# Patient Record
Sex: MALE | Race: WHITE | NOT HISPANIC OR LATINO | URBAN - METROPOLITAN AREA
[De-identification: names, ages, dates, MRNs, and addresses within clinical notes are randomized per-mention and may not be internally consistent; named-entity substitution may affect disease eponyms.]

---

## 2018-04-25 ENCOUNTER — OFFICE (OUTPATIENT)
Dept: URBAN - METROPOLITAN AREA CLINIC 11 | Facility: CLINIC | Age: 73
End: 2018-04-25

## 2018-04-25 VITALS
HEIGHT: 66 IN | WEIGHT: 182 LBS | HEART RATE: 90 BPM | SYSTOLIC BLOOD PRESSURE: 151 MMHG | DIASTOLIC BLOOD PRESSURE: 83 MMHG

## 2018-04-25 DIAGNOSIS — K59.00 CONSTIPATION, UNSPECIFIED: ICD-10-CM

## 2018-04-25 DIAGNOSIS — E11.9 TYPE 2 DIABETES MELLITUS WITHOUT COMPLICATIONS: ICD-10-CM

## 2018-04-25 DIAGNOSIS — R14.2 ERUCTATION: ICD-10-CM

## 2018-04-25 DIAGNOSIS — R14.0 ABDOMINAL DISTENSION (GASEOUS): ICD-10-CM

## 2018-04-25 DIAGNOSIS — R68.81 EARLY SATIETY: ICD-10-CM

## 2018-04-25 PROCEDURE — 99202 OFFICE O/P NEW SF 15 MIN: CPT

## 2018-09-18 ENCOUNTER — OFFICE (OUTPATIENT)
Dept: URBAN - METROPOLITAN AREA CLINIC 11 | Facility: CLINIC | Age: 73
End: 2018-09-18

## 2018-11-01 ENCOUNTER — OFFICE (OUTPATIENT)
Dept: URBAN - METROPOLITAN AREA CLINIC 11 | Facility: CLINIC | Age: 73
End: 2018-11-01

## 2018-11-01 VITALS
WEIGHT: 177 LBS | DIASTOLIC BLOOD PRESSURE: 77 MMHG | HEIGHT: 66 IN | SYSTOLIC BLOOD PRESSURE: 133 MMHG | HEART RATE: 96 BPM

## 2018-11-01 DIAGNOSIS — R12 HEARTBURN: ICD-10-CM

## 2018-11-01 DIAGNOSIS — R19.4 CHANGE IN BOWEL HABIT: ICD-10-CM

## 2018-11-01 DIAGNOSIS — E66.9 OBESITY, UNSPECIFIED: ICD-10-CM

## 2018-11-01 DIAGNOSIS — E11.9 TYPE 2 DIABETES MELLITUS WITHOUT COMPLICATIONS: ICD-10-CM

## 2018-11-01 PROCEDURE — 99213 OFFICE O/P EST LOW 20 MIN: CPT

## 2018-11-01 RX ORDER — FAMOTIDINE, CALCIUM CARBONATE, AND MAGNESIUM HYDROXIDE 10; 800; 165 MG/1; MG/1; MG/1
30 TABLET, CHEWABLE ORAL
Qty: 30 | Refills: 0 | Status: COMPLETED
Start: 2018-11-01 | End: 2020-12-16

## 2019-07-29 ENCOUNTER — OFFICE (OUTPATIENT)
Dept: URBAN - METROPOLITAN AREA CLINIC 11 | Facility: CLINIC | Age: 74
End: 2019-07-29

## 2019-07-29 VITALS
DIASTOLIC BLOOD PRESSURE: 92 MMHG | HEIGHT: 66 IN | HEART RATE: 90 BPM | DIASTOLIC BLOOD PRESSURE: 76 MMHG | WEIGHT: 173 LBS | SYSTOLIC BLOOD PRESSURE: 144 MMHG | SYSTOLIC BLOOD PRESSURE: 149 MMHG

## 2019-07-29 DIAGNOSIS — R14.2 ERUCTATION: ICD-10-CM

## 2019-07-29 DIAGNOSIS — R10.32 LEFT LOWER QUADRANT PAIN: ICD-10-CM

## 2019-07-29 DIAGNOSIS — F45.8 OTHER SOMATOFORM DISORDERS: ICD-10-CM

## 2019-07-29 PROCEDURE — 99215 OFFICE O/P EST HI 40 MIN: CPT

## 2019-10-22 ENCOUNTER — OFFICE (OUTPATIENT)
Dept: URBAN - METROPOLITAN AREA CLINIC 11 | Facility: CLINIC | Age: 74
End: 2019-10-22

## 2019-10-22 VITALS
WEIGHT: 176 LBS | DIASTOLIC BLOOD PRESSURE: 78 MMHG | HEIGHT: 66 IN | SYSTOLIC BLOOD PRESSURE: 178 MMHG | HEART RATE: 113 BPM

## 2019-10-22 DIAGNOSIS — R10.32 LEFT LOWER QUADRANT PAIN: ICD-10-CM

## 2019-10-22 DIAGNOSIS — R14.2 ERUCTATION: ICD-10-CM

## 2019-10-22 DIAGNOSIS — R14.3 FLATULENCE: ICD-10-CM

## 2019-10-22 DIAGNOSIS — R14.0 ABDOMINAL DISTENSION (GASEOUS): ICD-10-CM

## 2019-10-22 PROCEDURE — 99214 OFFICE O/P EST MOD 30 MIN: CPT

## 2019-10-22 RX ORDER — HYOSCYAMINE SULFATE 0.12 MG/1
TABLET ORAL; SUBLINGUAL
Qty: 30 | Refills: 0 | Status: COMPLETED
Start: 2019-10-22 | End: 2020-10-13

## 2019-10-25 ENCOUNTER — OFFICE (OUTPATIENT)
Dept: URBAN - METROPOLITAN AREA CLINIC 11 | Facility: CLINIC | Age: 74
End: 2019-10-25
Payer: COMMERCIAL

## 2019-10-25 DIAGNOSIS — R14.0 ABDOMINAL DISTENSION (GASEOUS): ICD-10-CM

## 2019-10-25 PROCEDURE — 91065 BREATH HYDROGEN/METHANE TEST: CPT

## 2020-10-13 ENCOUNTER — OFFICE (OUTPATIENT)
Dept: URBAN - METROPOLITAN AREA CLINIC 11 | Facility: CLINIC | Age: 75
End: 2020-10-13
Payer: COMMERCIAL

## 2020-10-13 VITALS
SYSTOLIC BLOOD PRESSURE: 137 MMHG | DIASTOLIC BLOOD PRESSURE: 79 MMHG | HEART RATE: 97 BPM | WEIGHT: 172 LBS | HEIGHT: 66 IN

## 2020-10-13 DIAGNOSIS — K21.9 GASTRO-ESOPHAGEAL REFLUX DISEASE WITHOUT ESOPHAGITIS: ICD-10-CM

## 2020-10-13 DIAGNOSIS — R14.2 ERUCTATION: ICD-10-CM

## 2020-10-13 DIAGNOSIS — F45.8 OTHER SOMATOFORM DISORDERS: ICD-10-CM

## 2020-10-13 DIAGNOSIS — R14.0 ABDOMINAL DISTENSION (GASEOUS): ICD-10-CM

## 2020-10-13 PROCEDURE — 99214 OFFICE O/P EST MOD 30 MIN: CPT

## 2020-10-13 RX ORDER — OMEPRAZOLE 40 MG/1
80 CAPSULE, DELAYED RELEASE ORAL
Qty: 60 | Refills: 2 | Status: COMPLETED
Start: 2020-10-13 | End: 2021-04-08

## 2020-12-16 ENCOUNTER — OFFICE (OUTPATIENT)
Dept: URBAN - METROPOLITAN AREA CLINIC 11 | Facility: CLINIC | Age: 75
End: 2020-12-16
Payer: COMMERCIAL

## 2020-12-16 VITALS
OXYGEN SATURATION: 100 % | HEIGHT: 66 IN | SYSTOLIC BLOOD PRESSURE: 137 MMHG | HEART RATE: 103 BPM | DIASTOLIC BLOOD PRESSURE: 80 MMHG | WEIGHT: 175 LBS

## 2020-12-16 DIAGNOSIS — R14.2 ERUCTATION: ICD-10-CM

## 2020-12-16 DIAGNOSIS — R14.3 FLATULENCE: ICD-10-CM

## 2020-12-16 DIAGNOSIS — R19.4 CHANGE IN BOWEL HABIT: ICD-10-CM

## 2020-12-16 DIAGNOSIS — A04.9 BACTERIAL INTESTINAL INFECTION, UNSPECIFIED: ICD-10-CM

## 2020-12-16 PROCEDURE — 99214 OFFICE O/P EST MOD 30 MIN: CPT

## 2020-12-16 RX ORDER — RIFAXIMIN 550 MG/1
1650 TABLET ORAL
Qty: 42 | Refills: 0 | Status: COMPLETED
Start: 2020-12-16 | End: 2021-01-13

## 2020-12-16 RX ORDER — NEOMYCIN SULFATE 500 MG/1
1000 TABLET ORAL
Qty: 28 | Refills: 0 | Status: COMPLETED
Start: 2020-12-16 | End: 2021-01-13

## 2020-12-16 NOTE — SERVICEHPINOTES
Mr. Dutton is a 76 y/o WM who presents for follow-up on persistent symptoms. He continues to have burping with 3-4 burps up to 7-8 in a short period of time that wake him up from sleep. They can also occur in the daytime in no predictable pattern. He has no associated heartburn or regurgitation. He was started on BID omeprazole 40mg about 2 months ago with no change in burping. He states his GERD is well controlled with no breakthough. He has sleep apnea and wears a CPAP but denies recent snoring or apneic periods. Since starting the high dose omeprazole, he admits his bowel pattern is slightly changed, going from [place] type 4 to type 5 with small formed little pieces, usually once daily. No diarrhea. He has recurrence of malodorous flatulence. He has cheese on occasion, otherwise no dairy intake. He had a lactulose breath test 10/25/2019 with elevation in methane level consistent with SIBO. He was treated with xifaxan and neomycin at that time with resolution in the flatulence for quite some time before symptoms recurred. Impression:BR-Given prior improvement in flatulence with xifaxan/neomycin, I suspect symptoms are due to recurrence of SIBO. Will empirically retreat, with further evaluation if symptoms persist.BR-Persistent burping. Other than sleep apnea, no risk factors for aerophagia. May also be related to reflux. Will evaluate with Upper GI study. Burping can be increased in those with reflux due to incompetent LES.

## 2020-12-16 NOTE — SERVICENOTES
The patient's assessment was reviewed with Dr. Fung and a collaborative plan of care was established.

## 2021-01-13 ENCOUNTER — OFFICE (OUTPATIENT)
Dept: URBAN - METROPOLITAN AREA PATHOLOGY 22 | Facility: PATHOLOGY | Age: 76
End: 2021-01-13
Payer: COMMERCIAL

## 2021-01-13 ENCOUNTER — AMBULATORY SURGICAL CENTER (OUTPATIENT)
Dept: URBAN - METROPOLITAN AREA SURGERY 3 | Facility: SURGERY | Age: 76
End: 2021-01-13

## 2021-01-13 ENCOUNTER — AMBULATORY SURGICAL CENTER (OUTPATIENT)
Dept: URBAN - METROPOLITAN AREA SURGERY 3 | Facility: SURGERY | Age: 76
End: 2021-01-13
Payer: COMMERCIAL

## 2021-01-13 VITALS
DIASTOLIC BLOOD PRESSURE: 72 MMHG | SYSTOLIC BLOOD PRESSURE: 140 MMHG | SYSTOLIC BLOOD PRESSURE: 115 MMHG | TEMPERATURE: 96.8 F | OXYGEN SATURATION: 100 % | OXYGEN SATURATION: 99 % | SYSTOLIC BLOOD PRESSURE: 133 MMHG | RESPIRATION RATE: 19 BRPM | DIASTOLIC BLOOD PRESSURE: 91 MMHG | HEART RATE: 94 BPM | OXYGEN SATURATION: 95 % | RESPIRATION RATE: 20 BRPM | HEART RATE: 95 BPM | RESPIRATION RATE: 20 BRPM | OXYGEN SATURATION: 96 % | DIASTOLIC BLOOD PRESSURE: 86 MMHG | TEMPERATURE: 97.2 F | RESPIRATION RATE: 15 BRPM | OXYGEN SATURATION: 99 % | SYSTOLIC BLOOD PRESSURE: 156 MMHG | WEIGHT: 176 LBS | DIASTOLIC BLOOD PRESSURE: 91 MMHG | RESPIRATION RATE: 20 BRPM | RESPIRATION RATE: 17 BRPM | DIASTOLIC BLOOD PRESSURE: 86 MMHG | TEMPERATURE: 97.2 F | RESPIRATION RATE: 19 BRPM | TEMPERATURE: 96.8 F | HEART RATE: 94 BPM | TEMPERATURE: 96.8 F | DIASTOLIC BLOOD PRESSURE: 56 MMHG | SYSTOLIC BLOOD PRESSURE: 115 MMHG | SYSTOLIC BLOOD PRESSURE: 133 MMHG | DIASTOLIC BLOOD PRESSURE: 72 MMHG | OXYGEN SATURATION: 96 % | OXYGEN SATURATION: 98 % | OXYGEN SATURATION: 95 % | DIASTOLIC BLOOD PRESSURE: 91 MMHG | RESPIRATION RATE: 15 BRPM | OXYGEN SATURATION: 96 % | SYSTOLIC BLOOD PRESSURE: 140 MMHG | HEART RATE: 95 BPM | SYSTOLIC BLOOD PRESSURE: 146 MMHG | OXYGEN SATURATION: 98 % | DIASTOLIC BLOOD PRESSURE: 72 MMHG | OXYGEN SATURATION: 98 % | OXYGEN SATURATION: 100 % | HEART RATE: 96 BPM | WEIGHT: 176 LBS | HEART RATE: 96 BPM | HEART RATE: 94 BPM | OXYGEN SATURATION: 95 % | DIASTOLIC BLOOD PRESSURE: 86 MMHG | SYSTOLIC BLOOD PRESSURE: 140 MMHG | DIASTOLIC BLOOD PRESSURE: 56 MMHG | DIASTOLIC BLOOD PRESSURE: 56 MMHG | RESPIRATION RATE: 17 BRPM | SYSTOLIC BLOOD PRESSURE: 146 MMHG | TEMPERATURE: 97.2 F | SYSTOLIC BLOOD PRESSURE: 146 MMHG | OXYGEN SATURATION: 99 % | SYSTOLIC BLOOD PRESSURE: 156 MMHG | HEART RATE: 95 BPM | SYSTOLIC BLOOD PRESSURE: 156 MMHG | HEIGHT: 66 IN | RESPIRATION RATE: 17 BRPM | OXYGEN SATURATION: 100 % | RESPIRATION RATE: 15 BRPM | RESPIRATION RATE: 19 BRPM | SYSTOLIC BLOOD PRESSURE: 115 MMHG | HEIGHT: 66 IN | WEIGHT: 176 LBS | SYSTOLIC BLOOD PRESSURE: 133 MMHG | HEIGHT: 66 IN | HEART RATE: 96 BPM

## 2021-01-13 DIAGNOSIS — K29.50 UNSPECIFIED CHRONIC GASTRITIS WITHOUT BLEEDING: ICD-10-CM

## 2021-01-13 DIAGNOSIS — K21.00 GASTRO-ESOPHAGEAL REFLUX DISEASE WITH ESOPHAGITIS, WITHOUT B: ICD-10-CM

## 2021-01-13 DIAGNOSIS — R93.3 ABNORMAL FINDINGS ON DIAGNOSTIC IMAGING OF OTHER PARTS OF DI: ICD-10-CM

## 2021-01-13 DIAGNOSIS — K44.9 DIAPHRAGMATIC HERNIA WITHOUT OBSTRUCTION OR GANGRENE: ICD-10-CM

## 2021-01-13 PROBLEM — K31.89 OTHER DISEASES OF STOMACH AND DUODENUM: Status: ACTIVE | Noted: 2021-01-13

## 2021-01-13 PROCEDURE — G8918 PT W/O PREOP ORDER IV AB PRO: HCPCS

## 2021-01-13 PROCEDURE — G8907 PT DOC NO EVENTS ON DISCHARG: HCPCS

## 2021-01-13 PROCEDURE — 43239 EGD BIOPSY SINGLE/MULTIPLE: CPT

## 2021-01-13 PROCEDURE — 88313 SPECIAL STAINS GROUP 2: CPT

## 2021-01-13 PROCEDURE — 88305 TISSUE EXAM BY PATHOLOGIST: CPT

## 2021-01-13 PROCEDURE — 88342 IMHCHEM/IMCYTCHM 1ST ANTB: CPT

## 2021-01-13 NOTE — SERVICEHPINOTES
Abnormal findings on diagnostic imaging of other parts of digestive tract - upper GI study with minimal thickening of small bowel loops

## 2021-03-18 ENCOUNTER — AMBULATORY SURGICAL CENTER (OUTPATIENT)
Dept: URBAN - METROPOLITAN AREA SURGERY 3 | Facility: SURGERY | Age: 76
End: 2021-03-18

## 2021-03-18 ENCOUNTER — AMBULATORY SURGICAL CENTER (OUTPATIENT)
Dept: URBAN - METROPOLITAN AREA SURGERY 3 | Facility: SURGERY | Age: 76
End: 2021-03-18
Payer: COMMERCIAL

## 2021-03-18 ENCOUNTER — OFFICE (OUTPATIENT)
Dept: URBAN - METROPOLITAN AREA PATHOLOGY 22 | Facility: PATHOLOGY | Age: 76
End: 2021-03-18
Payer: COMMERCIAL

## 2021-03-18 VITALS
HEART RATE: 96 BPM | HEART RATE: 96 BPM | HEIGHT: 66 IN | DIASTOLIC BLOOD PRESSURE: 60 MMHG | RESPIRATION RATE: 20 BRPM | SYSTOLIC BLOOD PRESSURE: 131 MMHG | SYSTOLIC BLOOD PRESSURE: 131 MMHG | OXYGEN SATURATION: 92 % | DIASTOLIC BLOOD PRESSURE: 64 MMHG | HEART RATE: 90 BPM | HEART RATE: 92 BPM | DIASTOLIC BLOOD PRESSURE: 95 MMHG | HEART RATE: 88 BPM | WEIGHT: 176 LBS | HEART RATE: 92 BPM | OXYGEN SATURATION: 92 % | DIASTOLIC BLOOD PRESSURE: 84 MMHG | OXYGEN SATURATION: 94 % | HEART RATE: 99 BPM | SYSTOLIC BLOOD PRESSURE: 151 MMHG | DIASTOLIC BLOOD PRESSURE: 84 MMHG | RESPIRATION RATE: 20 BRPM | OXYGEN SATURATION: 92 % | OXYGEN SATURATION: 94 % | HEART RATE: 99 BPM | WEIGHT: 176 LBS | TEMPERATURE: 97.2 F | SYSTOLIC BLOOD PRESSURE: 147 MMHG | HEART RATE: 99 BPM | HEART RATE: 90 BPM | DIASTOLIC BLOOD PRESSURE: 64 MMHG | DIASTOLIC BLOOD PRESSURE: 95 MMHG | SYSTOLIC BLOOD PRESSURE: 125 MMHG | SYSTOLIC BLOOD PRESSURE: 151 MMHG | RESPIRATION RATE: 18 BRPM | RESPIRATION RATE: 18 BRPM | DIASTOLIC BLOOD PRESSURE: 76 MMHG | DIASTOLIC BLOOD PRESSURE: 64 MMHG | DIASTOLIC BLOOD PRESSURE: 60 MMHG | HEIGHT: 66 IN | HEART RATE: 96 BPM | SYSTOLIC BLOOD PRESSURE: 125 MMHG | OXYGEN SATURATION: 98 % | OXYGEN SATURATION: 93 % | HEART RATE: 88 BPM | TEMPERATURE: 97.2 F | SYSTOLIC BLOOD PRESSURE: 125 MMHG | RESPIRATION RATE: 20 BRPM | DIASTOLIC BLOOD PRESSURE: 76 MMHG | OXYGEN SATURATION: 94 % | SYSTOLIC BLOOD PRESSURE: 131 MMHG | OXYGEN SATURATION: 93 % | OXYGEN SATURATION: 98 % | DIASTOLIC BLOOD PRESSURE: 95 MMHG | SYSTOLIC BLOOD PRESSURE: 132 MMHG | HEART RATE: 92 BPM | RESPIRATION RATE: 18 BRPM | DIASTOLIC BLOOD PRESSURE: 84 MMHG | DIASTOLIC BLOOD PRESSURE: 76 MMHG | SYSTOLIC BLOOD PRESSURE: 147 MMHG | DIASTOLIC BLOOD PRESSURE: 60 MMHG | OXYGEN SATURATION: 98 % | OXYGEN SATURATION: 93 % | SYSTOLIC BLOOD PRESSURE: 147 MMHG | SYSTOLIC BLOOD PRESSURE: 132 MMHG | TEMPERATURE: 97.2 F | HEIGHT: 66 IN | SYSTOLIC BLOOD PRESSURE: 151 MMHG | WEIGHT: 176 LBS | HEART RATE: 88 BPM | SYSTOLIC BLOOD PRESSURE: 132 MMHG | HEART RATE: 90 BPM

## 2021-03-18 DIAGNOSIS — K92.1 MELENA: ICD-10-CM

## 2021-03-18 DIAGNOSIS — K31.89 OTHER DISEASES OF STOMACH AND DUODENUM: ICD-10-CM

## 2021-03-18 DIAGNOSIS — K22.711 BARRETT'S ESOPHAGUS WITH HIGH GRADE DYSPLASIA: ICD-10-CM

## 2021-03-18 PROBLEM — K22.8 OTHER SPECIFIED DISEASES OF ESOPHAGUS: Status: ACTIVE | Noted: 2021-03-18

## 2021-03-18 PROCEDURE — 43239 EGD BIOPSY SINGLE/MULTIPLE: CPT

## 2021-03-18 PROCEDURE — 88342 IMHCHEM/IMCYTCHM 1ST ANTB: CPT | Mod: 49

## 2021-03-18 PROCEDURE — G8907 PT DOC NO EVENTS ON DISCHARG: HCPCS

## 2021-03-18 PROCEDURE — 88313 SPECIAL STAINS GROUP 2: CPT

## 2021-03-18 PROCEDURE — 88305 TISSUE EXAM BY PATHOLOGIST: CPT

## 2021-03-18 PROCEDURE — 88341 IMHCHEM/IMCYTCHM EA ADD ANTB: CPT

## 2021-03-18 PROCEDURE — G8918 PT W/O PREOP ORDER IV AB PRO: HCPCS

## 2021-03-20 ENCOUNTER — INPATIENT HOSPITAL (OUTPATIENT)
Dept: URBAN - METROPOLITAN AREA HOSPITAL 130 | Facility: HOSPITAL | Age: 76
End: 2021-03-20
Payer: COMMERCIAL

## 2021-03-20 DIAGNOSIS — K21.9 GASTRO-ESOPHAGEAL REFLUX DISEASE WITHOUT ESOPHAGITIS: ICD-10-CM

## 2021-03-20 DIAGNOSIS — K92.2 GASTROINTESTINAL HEMORRHAGE, UNSPECIFIED: ICD-10-CM

## 2021-03-20 DIAGNOSIS — R10.84 GENERALIZED ABDOMINAL PAIN: ICD-10-CM

## 2021-03-20 DIAGNOSIS — R19.5 OTHER FECAL ABNORMALITIES: ICD-10-CM

## 2021-03-20 PROCEDURE — 99222 1ST HOSP IP/OBS MODERATE 55: CPT | Performed by: INTERNAL MEDICINE

## 2021-03-21 ENCOUNTER — INPATIENT HOSPITAL (OUTPATIENT)
Dept: URBAN - METROPOLITAN AREA HOSPITAL 130 | Facility: HOSPITAL | Age: 76
End: 2021-03-21
Payer: COMMERCIAL

## 2021-03-21 DIAGNOSIS — K21.9 GASTRO-ESOPHAGEAL REFLUX DISEASE WITHOUT ESOPHAGITIS: ICD-10-CM

## 2021-03-21 DIAGNOSIS — K92.2 GASTROINTESTINAL HEMORRHAGE, UNSPECIFIED: ICD-10-CM

## 2021-03-21 DIAGNOSIS — R10.84 GENERALIZED ABDOMINAL PAIN: ICD-10-CM

## 2021-03-21 DIAGNOSIS — R19.5 OTHER FECAL ABNORMALITIES: ICD-10-CM

## 2021-03-21 PROCEDURE — 99232 SBSQ HOSP IP/OBS MODERATE 35: CPT | Performed by: INTERNAL MEDICINE

## 2021-03-22 ENCOUNTER — INPATIENT HOSPITAL (OUTPATIENT)
Dept: URBAN - METROPOLITAN AREA HOSPITAL 130 | Facility: HOSPITAL | Age: 76
End: 2021-03-22
Payer: COMMERCIAL

## 2021-03-22 DIAGNOSIS — D12.2 BENIGN NEOPLASM OF ASCENDING COLON: ICD-10-CM

## 2021-03-22 DIAGNOSIS — K92.2 GASTROINTESTINAL HEMORRHAGE, UNSPECIFIED: ICD-10-CM

## 2021-03-22 DIAGNOSIS — D50.9 IRON DEFICIENCY ANEMIA, UNSPECIFIED: ICD-10-CM

## 2021-03-22 PROCEDURE — 45380 COLONOSCOPY AND BIOPSY: CPT | Performed by: INTERNAL MEDICINE

## 2021-04-08 ENCOUNTER — OFFICE (OUTPATIENT)
Dept: URBAN - METROPOLITAN AREA CLINIC 19 | Facility: CLINIC | Age: 76
End: 2021-04-08
Payer: COMMERCIAL

## 2021-04-08 VITALS
OXYGEN SATURATION: 98 % | SYSTOLIC BLOOD PRESSURE: 133 MMHG | HEIGHT: 66 IN | DIASTOLIC BLOOD PRESSURE: 74 MMHG | HEART RATE: 101 BPM | WEIGHT: 169 LBS

## 2021-04-08 DIAGNOSIS — Z86.010 PERSONAL HISTORY OF COLONIC POLYPS: ICD-10-CM

## 2021-04-08 DIAGNOSIS — T82.897A: ICD-10-CM

## 2021-04-08 DIAGNOSIS — K22.711 BARRETT'S ESOPHAGUS WITH HIGH GRADE DYSPLASIA: ICD-10-CM

## 2021-04-08 PROCEDURE — 99214 OFFICE O/P EST MOD 30 MIN: CPT | Performed by: INTERNAL MEDICINE

## 2021-04-08 NOTE — SERVICEHPINOTES
75-year-old  diabetic white male patient of Gino Fung MD  returns for follow-up after recent hospitalization at [facility] for abdominal pain, nausea, upper GI bleed (melena) and mild anemia. CTA 3/20/21 found  no evidence of active GI bleed. His hematocrit dropped from 42.9% to 37.2%.  No blood transfusions were required.  I performed colonoscopy 3/22/21 and found diverticula and a small adenomatous colon polyp.  Dr. Fung  performed EGD the day prior to admission on 3/18/21 and found a short-segment Adams's esophagus. Biopsies found a focus of high-grade dysplasia.  Halo RFA was considered.  Interestingly, EGD 1/13/21 found no esophagitis.  Biopsies were negative for EoE, H. pylori and  celiac.  He is currently taking Protonix 40 mg BID.  He has recently been on Plavix for coronary artery disease, but this is on hold.  He was seen last December for belching and flatus.  He was taking Zantac 300 mg BID at that time. UGI 12/18/20 found a small hiatal hernia and "minimal thickening of the small bowel loops which may be related to enteritis".  SIBO was documented on lactulose breath test 10/25/19 and treated with Xifaxan/Neomycin 2019 and again last December. Other GI ice studies have  largely been unremarkable, including several CT abdomen/pelvis (7/19/19 and 12/8/14), NM GES 5/4/18, colonoscopy 9/27/12 and EGD/colonoscopy 1/2/03.Family history is negative for Adams's, esophageal and colon neoplasm.  His brother had gastric adenocarcinoma.

## 2021-04-08 NOTE — SERVICENOTES
I spent 20 minutes with patient and an additional 25 minutes reviewing office and hospital records (total 45 min).

## 2021-05-11 ENCOUNTER — OFFICE (OUTPATIENT)
Dept: URBAN - METROPOLITAN AREA CLINIC 19 | Facility: CLINIC | Age: 76
End: 2021-05-11
Payer: COMMERCIAL

## 2021-05-11 VITALS
HEIGHT: 66 IN | WEIGHT: 168.2 LBS | SYSTOLIC BLOOD PRESSURE: 167 MMHG | DIASTOLIC BLOOD PRESSURE: 92 MMHG | OXYGEN SATURATION: 99 % | HEART RATE: 103 BPM

## 2021-05-11 DIAGNOSIS — K21.9 GASTRO-ESOPHAGEAL REFLUX DISEASE WITHOUT ESOPHAGITIS: ICD-10-CM

## 2021-05-11 DIAGNOSIS — T82.897A: ICD-10-CM

## 2021-05-11 DIAGNOSIS — Z86.010 PERSONAL HISTORY OF COLONIC POLYPS: ICD-10-CM

## 2021-05-11 DIAGNOSIS — K22.711 BARRETT'S ESOPHAGUS WITH HIGH GRADE DYSPLASIA: ICD-10-CM

## 2021-05-11 PROCEDURE — 99213 OFFICE O/P EST LOW 20 MIN: CPT | Performed by: INTERNAL MEDICINE

## 2021-07-21 ENCOUNTER — ON CAMPUS - OUTPATIENT (OUTPATIENT)
Dept: URBAN - METROPOLITAN AREA HOSPITAL 131 | Facility: HOSPITAL | Age: 76
End: 2021-07-21
Payer: COMMERCIAL

## 2021-07-21 DIAGNOSIS — K22.711 BARRETT'S ESOPHAGUS WITH HIGH GRADE DYSPLASIA: ICD-10-CM

## 2021-07-21 DIAGNOSIS — K21.9 GASTRO-ESOPHAGEAL REFLUX DISEASE WITHOUT ESOPHAGITIS: ICD-10-CM

## 2021-07-21 DIAGNOSIS — D12.2 BENIGN NEOPLASM OF ASCENDING COLON: ICD-10-CM

## 2021-07-21 DIAGNOSIS — K92.2 GASTROINTESTINAL HEMORRHAGE, UNSPECIFIED: ICD-10-CM

## 2021-07-21 PROCEDURE — 43270 EGD LESION ABLATION: CPT | Performed by: INTERNAL MEDICINE

## 2021-11-11 ENCOUNTER — OFFICE (OUTPATIENT)
Dept: URBAN - METROPOLITAN AREA CLINIC 19 | Facility: CLINIC | Age: 76
End: 2021-11-11
Payer: COMMERCIAL

## 2021-11-11 VITALS
HEART RATE: 97 BPM | OXYGEN SATURATION: 99 % | SYSTOLIC BLOOD PRESSURE: 121 MMHG | HEIGHT: 66 IN | DIASTOLIC BLOOD PRESSURE: 72 MMHG | WEIGHT: 173 LBS

## 2021-11-11 DIAGNOSIS — T82.897A: ICD-10-CM

## 2021-11-11 DIAGNOSIS — K21.9 GASTRO-ESOPHAGEAL REFLUX DISEASE WITHOUT ESOPHAGITIS: ICD-10-CM

## 2021-11-11 DIAGNOSIS — K22.711 BARRETT'S ESOPHAGUS WITH HIGH GRADE DYSPLASIA: ICD-10-CM

## 2021-11-11 DIAGNOSIS — R10.11 RIGHT UPPER QUADRANT PAIN: ICD-10-CM

## 2021-11-11 DIAGNOSIS — R11.2 NAUSEA WITH VOMITING, UNSPECIFIED: ICD-10-CM

## 2021-11-11 PROCEDURE — 99214 OFFICE O/P EST MOD 30 MIN: CPT

## 2021-11-11 NOTE — SERVICENOTES
The patient's assessment was reviewed with Dr. Ruth and a collaborative plan of care was established.

## 2021-11-11 NOTE — SERVICEHPINOTES
76-year-old  diabetic white male returns for complaints of acute nausea and vomiting for the last 5 days.
carson Paige was recently restarted on Ozempic for his diabetes a week ago.  Five days ago he developed acute nausea and vomiting.  It seemed to be most acute after he would eat or even drink water.  It seemed to improve on Sunday through Tuesday and he was able to eat eggs and "more normal food".  However, his symptoms seem to returned and worsened over the last few days.  Any times he eats, he has significant nausea and vomits it up.  He has also had some right upper quadrant pain, but this pain has been there intermittently for several weeks.  He was given a prescription for an unnamed antiemetic by his PCP which has been helpful.  He denies dysphagia, change in bowel habit or overt GI bleeding.  He does have short-segment Adams's esophagus with high-grade dysplasia and underwent RFA halo treatment with Dr. Ruth this summer, on 7/21/21.  He is due for his next RFA halo treatment, but unfortunately the equipment is on back order.
carson Paige was here 4/8/21 to see Dr. Ruth following a recent hospitalization at [facility] for abdominal pain, nausea, upper GI bleed (melena) and mild anemia. CTA 3/20/21 found no evidence of active GI bleed. His hematocrit dropped from 42.9% to 37.2%. No blood transfusions were required. Dr. Ruth performed colonoscopy 3/22/21 and found diverticula and a small adenomatous colon polyp. Dr. Fung performed EGD the day prior to admission on 3/18/21 and found a short-segment Adams's esophagus. Biopsies found a focus of high-grade dysplasia. Halo RFA was considered. Interestingly, EGD 1/13/21 found no esophagitis. Biopsies were negative for EoE, H. pylori and celiac. 
carson byrd He is currently taking Protonix 40 mg BID. He is on Plavix for coronary artery disease.Dr. Ruth discussed the situation with Dr. Fung that his EGD in January was normal, but short-segment Adams's esophagus with HGD was documented in March and whether it was best to repeat EGD with biopsy or proceed with Halo RFA therapy.  He was seen last December for belching and flatus. He was taking Zantac 300 mg BID at that time. UGI 12/18/20 found a small hiatal hernia and "minimal thickening of the small bowel loops which may be related to enteritis". SIBO was documented on lactulose breath test 10/25/19 and treated with Xifaxan/Neomycin 2019 and again last December.Other GI ice studies have largely been unremarkable, including several CT abdomen/pelvis (7/19/19 and 12/8/14), NM GES 5/4/18, colonoscopy 9/27/12 and EGD/colonoscopy 1/2/03.Family history is negative for Adams's, esophageal and colon neoplasm. His brother had gastric adenocarcinoma.

## 2022-04-14 ENCOUNTER — ON CAMPUS - OUTPATIENT (OUTPATIENT)
Dept: URBAN - METROPOLITAN AREA HOSPITAL 131 | Facility: HOSPITAL | Age: 77
End: 2022-04-14
Payer: COMMERCIAL

## 2022-04-14 DIAGNOSIS — K22.711 BARRETT'S ESOPHAGUS WITH HIGH GRADE DYSPLASIA: ICD-10-CM

## 2022-04-14 PROCEDURE — 43270 EGD LESION ABLATION: CPT | Performed by: INTERNAL MEDICINE

## 2022-07-07 ENCOUNTER — OFFICE (OUTPATIENT)
Dept: URBAN - METROPOLITAN AREA CLINIC 19 | Facility: CLINIC | Age: 77
End: 2022-07-07

## 2022-07-07 VITALS
OXYGEN SATURATION: 98 % | WEIGHT: 165 LBS | SYSTOLIC BLOOD PRESSURE: 125 MMHG | HEART RATE: 98 BPM | DIASTOLIC BLOOD PRESSURE: 69 MMHG | HEIGHT: 66 IN

## 2022-07-07 DIAGNOSIS — K21.9 GASTRO-ESOPHAGEAL REFLUX DISEASE WITHOUT ESOPHAGITIS: ICD-10-CM

## 2022-07-07 DIAGNOSIS — R10.10 UPPER ABDOMINAL PAIN, UNSPECIFIED: ICD-10-CM

## 2022-07-07 DIAGNOSIS — K22.711 BARRETT'S ESOPHAGUS WITH HIGH GRADE DYSPLASIA: ICD-10-CM

## 2022-07-07 DIAGNOSIS — R11.2 NAUSEA WITH VOMITING, UNSPECIFIED: ICD-10-CM

## 2022-07-07 DIAGNOSIS — Z86.010 PERSONAL HISTORY OF COLONIC POLYPS: ICD-10-CM

## 2022-07-07 DIAGNOSIS — T82.897A: ICD-10-CM

## 2022-07-07 LAB
AMYLASE: 40 U/L (ref 31–110)
CBC, PLATELET, NO DIFFERENTIAL: HEMATOCRIT: 50.1 % (ref 37.5–51)
CBC, PLATELET, NO DIFFERENTIAL: HEMOGLOBIN: 16.1 G/DL (ref 13–17.7)
CBC, PLATELET, NO DIFFERENTIAL: MCH: 28.5 PG (ref 26.6–33)
CBC, PLATELET, NO DIFFERENTIAL: MCHC: 32.1 G/DL (ref 31.5–35.7)
CBC, PLATELET, NO DIFFERENTIAL: MCV: 89 FL (ref 79–97)
CBC, PLATELET, NO DIFFERENTIAL: PLATELETS: 307 X10E3/UL (ref 150–450)
CBC, PLATELET, NO DIFFERENTIAL: RBC: 5.65 X10E6/UL (ref 4.14–5.8)
CBC, PLATELET, NO DIFFERENTIAL: RDW: 13.2 % (ref 11.6–15.4)
CBC, PLATELET, NO DIFFERENTIAL: WBC: 7 X10E3/UL (ref 3.4–10.8)
COMP. METABOLIC PANEL (14): A/G RATIO: 2 (ref 1.2–2.2)
COMP. METABOLIC PANEL (14): ALBUMIN: 4.6 G/DL (ref 3.7–4.7)
COMP. METABOLIC PANEL (14): ALKALINE PHOSPHATASE: 85 IU/L (ref 44–121)
COMP. METABOLIC PANEL (14): ALT (SGPT): 20 IU/L (ref 0–44)
COMP. METABOLIC PANEL (14): AST (SGOT): 17 IU/L (ref 0–40)
COMP. METABOLIC PANEL (14): BILIRUBIN, TOTAL: 0.4 MG/DL (ref 0–1.2)
COMP. METABOLIC PANEL (14): BUN/CREATININE RATIO: 16 (ref 10–24)
COMP. METABOLIC PANEL (14): BUN: 15 MG/DL (ref 8–27)
COMP. METABOLIC PANEL (14): CALCIUM: 10.2 MG/DL (ref 8.6–10.2)
COMP. METABOLIC PANEL (14): CARBON DIOXIDE, TOTAL: 23 MMOL/L (ref 20–29)
COMP. METABOLIC PANEL (14): CHLORIDE: 101 MMOL/L (ref 96–106)
COMP. METABOLIC PANEL (14): CREATININE: 0.94 MG/DL (ref 0.76–1.27)
COMP. METABOLIC PANEL (14): EGFR: 85 ML/MIN/1.73 (ref 59–?)
COMP. METABOLIC PANEL (14): GLOBULIN, TOTAL: 2.3 G/DL (ref 1.5–4.5)
COMP. METABOLIC PANEL (14): GLUCOSE: 184 MG/DL — HIGH (ref 65–99)
COMP. METABOLIC PANEL (14): POTASSIUM: 4.5 MMOL/L (ref 3.5–5.2)
COMP. METABOLIC PANEL (14): PROTEIN, TOTAL: 6.9 G/DL (ref 6–8.5)
COMP. METABOLIC PANEL (14): SODIUM: 141 MMOL/L (ref 134–144)
LIPASE: 56 U/L (ref 13–78)

## 2022-07-07 PROCEDURE — 99214 OFFICE O/P EST MOD 30 MIN: CPT

## 2022-07-07 RX ORDER — ONDANSETRON 4 MG/1
TABLET, ORALLY DISINTEGRATING ORAL
Qty: 30 | Refills: 0 | Status: COMPLETED
Start: 2022-07-07 | End: 2024-02-15

## 2022-07-07 NOTE — SERVICEHPINOTES
76-year-old  diabetic white male returns again for complaints of acute nausea and vomiting for the last 5 days.
carson Ellison had actually seen him 11/11/21 for the same complaints. He had recently restarted Ozempic for his diabetes a week prior.  The nausea and vomiting seemed to be most acute after he would eat or even drink water.  It seemed to improve after a few days and he was able to eat eggs and "more normal food".  However, his symptoms returned and worsened and he complained of right upper quadrant pain, but this pain had been there intermittently for several weeks.  He was given a prescription for an unnamed antiemetic by his PCP which has been helpful.  He had short-segment Adams's esophagus with high-grade dysplasia and underwent RFA halo treatment with Dr. Ruth last summer 7/21/21.  AUS 11/16/21 was normal and HIDA scan was remarkable for ejection fraction of 97%.   At that same time, he was having severe sinusitis and had been seen by ENT kR Park MD and given steroids for this.  Apparently after his sinus issues were resolved, his nausea vomiting resolved as well.  
carson byrd He had EGD with RFA 4/14/22.carson byrd  He returns today for complaints of acute nausea and vomiting after he ate Mexican food the night before.  Symptoms were persistent for almost 48 hours.  He did experience increased frequency in his stools with 7-8 bowel movements in a day.  He denies that it was "diarrhea".  He felt better and then [place]  with his wife where he had a pork sandwich.  He experienced significant nausea vomiting shortly after this as well.  He has had upper abdominal pain associated with the symptoms, but he admits it could be from abdominal soreness from the vomiting.  He is actually doing better today and has not had any vomiting in the last couple of days.  His bowel movements have gone back to normal.  He denies any alcohol in the last 3 months.  He denies dysphagia or overt GI bleeding.He was here 4/8/21 to see Dr. Ruth following a recent hospitalization at [facility] for abdominal pain, nausea, upper GI bleed (melena) and mild anemia. CTA 3/20/21 found no evidence of active GI bleed. His hematocrit dropped from 42.9% to 37.2%. No blood transfusions were required. Dr. Ruth performed colonoscopy 3/22/21 and found diverticula and a small adenomatous colon polyp. Dr. Fung performed EGD the day prior to admission on 3/18/21 and found a short-segment Adams's esophagus. Biopsies found a focus of high-grade dysplasia. Halo RFA was considered. Interestingly, EGD 1/13/21 found no esophagitis. Biopsies were negative for EoE, H. pylori and celiac.He is currently taking Protonix 40 mg BID. He is on Plavix for coronary artery disease.Dr. Ruth discussed the situation with Dr. Fung that his EGD in January was normal, but short-segment Adams's esophagus with HGD was documented in March and whether it was best to repeat EGD with biopsy or proceed with Halo RFA therapy.  He was seen last December for belching and flatus. He was taking Zantac 300 mg BID at that time. UGI 12/18/20 found a small hiatal hernia and "minimal thickening of the small bowel loops which may be related to enteritis". SIBO was documented on lactulose breath test 10/25/19 and treated with Xifaxan/Neomycin 2019 and again last December.Other GI ice studies have largely been unremarkable, including several CT abdomen/pelvis (7/19/19 and 12/8/14), NM GES 5/4/18, colonoscopy 9/27/12 and EGD/colonoscopy 1/2/03.Family history is negative for Adams's, esophageal and colon neoplasm. His brother had gastric adenocarcinoma.

## 2022-07-11 ENCOUNTER — OFFICE (OUTPATIENT)
Dept: URBAN - METROPOLITAN AREA CLINIC 22 | Facility: CLINIC | Age: 77
End: 2022-07-11

## 2022-07-11 DIAGNOSIS — K57.30 DIVERTICULOSIS OF LARGE INTESTINE WITHOUT PERFORATION OR ABS: ICD-10-CM

## 2022-07-11 DIAGNOSIS — K76.89 OTHER SPECIFIED DISEASES OF LIVER: ICD-10-CM

## 2022-07-11 PROCEDURE — 74177 CT ABD & PELVIS W/CONTRAST: CPT | Mod: TC

## 2022-10-31 ENCOUNTER — OFFICE (OUTPATIENT)
Dept: URBAN - METROPOLITAN AREA CLINIC 19 | Facility: CLINIC | Age: 77
End: 2022-10-31

## 2022-10-31 DIAGNOSIS — K22.70 BARRETT'S ESOPHAGUS WITHOUT DYSPLASIA: ICD-10-CM

## 2022-10-31 DIAGNOSIS — K21.9 GASTRO-ESOPHAGEAL REFLUX DISEASE WITHOUT ESOPHAGITIS: ICD-10-CM

## 2022-10-31 DIAGNOSIS — E66.9 OBESITY, UNSPECIFIED: ICD-10-CM

## 2022-10-31 PROCEDURE — 99490 CHRNC CARE MGMT STAFF 1ST 20: CPT | Performed by: INTERNAL MEDICINE

## 2022-11-30 ENCOUNTER — OFFICE (OUTPATIENT)
Dept: URBAN - METROPOLITAN AREA CLINIC 19 | Facility: CLINIC | Age: 77
End: 2022-11-30

## 2022-11-30 DIAGNOSIS — K22.70 BARRETT'S ESOPHAGUS WITHOUT DYSPLASIA: ICD-10-CM

## 2022-11-30 DIAGNOSIS — E11.9 TYPE 2 DIABETES MELLITUS WITHOUT COMPLICATIONS: ICD-10-CM

## 2022-11-30 DIAGNOSIS — G47.30 SLEEP APNEA, UNSPECIFIED: ICD-10-CM

## 2022-11-30 DIAGNOSIS — K21.9 GASTRO-ESOPHAGEAL REFLUX DISEASE WITHOUT ESOPHAGITIS: ICD-10-CM

## 2022-11-30 DIAGNOSIS — E78.5 HYPERLIPIDEMIA, UNSPECIFIED: ICD-10-CM

## 2022-11-30 DIAGNOSIS — E66.3 OVERWEIGHT: ICD-10-CM

## 2022-11-30 PROCEDURE — 99489 CPLX CHRNC CARE EA ADDL 30: CPT | Performed by: INTERNAL MEDICINE

## 2022-11-30 PROCEDURE — 99487 CPLX CHRNC CARE 1ST 60 MIN: CPT | Performed by: INTERNAL MEDICINE

## 2022-12-08 ENCOUNTER — OFFICE (OUTPATIENT)
Dept: URBAN - METROPOLITAN AREA CLINIC 19 | Facility: CLINIC | Age: 77
End: 2022-12-08

## 2022-12-08 VITALS
HEIGHT: 66 IN | HEART RATE: 72 BPM | DIASTOLIC BLOOD PRESSURE: 67 MMHG | OXYGEN SATURATION: 99 % | WEIGHT: 170 LBS | SYSTOLIC BLOOD PRESSURE: 132 MMHG

## 2022-12-08 DIAGNOSIS — Z86.010 PERSONAL HISTORY OF COLONIC POLYPS: ICD-10-CM

## 2022-12-08 DIAGNOSIS — K22.711 BARRETT'S ESOPHAGUS WITH HIGH GRADE DYSPLASIA: ICD-10-CM

## 2022-12-08 DIAGNOSIS — R10.32 LEFT LOWER QUADRANT PAIN: ICD-10-CM

## 2022-12-08 DIAGNOSIS — K21.9 GASTRO-ESOPHAGEAL REFLUX DISEASE WITHOUT ESOPHAGITIS: ICD-10-CM

## 2022-12-08 PROCEDURE — 99214 OFFICE O/P EST MOD 30 MIN: CPT

## 2022-12-08 RX ORDER — AMOXICILLIN AND CLAVULANATE POTASSIUM 875; 125 MG/1; 1/1
1750 TABLET, FILM COATED ORAL
Qty: 20 | Refills: 0 | Status: COMPLETED
Start: 2022-12-08 | End: 2023-01-17

## 2022-12-08 RX ORDER — PANTOPRAZOLE SODIUM 40 MG/1
40 TABLET, DELAYED RELEASE ORAL
Qty: 30 | Refills: 11 | Status: COMPLETED
Start: 2022-12-08 | End: 2023-01-17

## 2022-12-08 NOTE — SERVICEHPINOTES
76-year-old  diabetic white male returns to discuss an EGD for  Adams's esophagus surveillance as well as symptoms of LLQ pain and reflux.  Over the last week he has had mild pain to his LLQ.  He denies any change in his bowel habits or fever.  It does feel somewhat similar to his symptoms of diverticulitis in 2015. He has also had breakthrough reflux recently and is not on any antacid therapy at this time.  He denies dysphagia, change in bowel habit or overt GI bleeding.  He is on Plavix for history of stents placed and follows with cardiologist, Lavelle Ortega MD.   
br
carson I last saw him 7/7/22 for complaints of acute, recurrent nausea/vomiting.  Routine blood work was unremarkable and CT abdomen/ pelvis 7/11/22 was normal.  He had had previous similar complaints the year prior.  AUS 11/16/21 was normal and HIDA scan was remarkable for ejection fraction of 97% .  I referred him to surgeon, Hesham Davis MD for biliary hyperkinesia and to discuss a cholecystectomy.  He did have this done a few months ago and reports significant improvement and resolution in his symptoms.br
carson He had short-segment Adams's esophagus with high-grade dysplasia and underwent RFA halo treatment with Dr. Ruth last summer 7/21/21.  He had EGD with RFA 4/14/22.He was here 4/8/21 to see Dr. Ruth following a recent hospitalization at [facility] for abdominal pain, nausea, upper GI bleed (melena) and mild anemia. CTA 3/20/21 found no evidence of active GI bleed. His hematocrit dropped from 42.9% to 37.2%. No blood transfusions were required. Dr. Ruth performed colonoscopy 3/22/21 and found diverticula and a small adenomatous colon polyp. Dr. Fung performed EGD the day prior to admission on 3/18/21 and found a short-segment Adams's esophagus. Biopsies found a focus of high-grade dysplasia. Halo RFA was considered. Interestingly, EGD 1/13/21 found no esophagitis. Biopsies were negative for EoE, H. pylori and celiac.Dr. Ruth discussed the situation with Dr. Fung that his EGD in January was normal, but short-segment Adams's esophagus with HGD was documented in March and whether it was best to repeat EGD with biopsy or proceed with Halo RFA therapy.  He was seen last December for belching and flatus. He was taking Zantac 300 mg BID at that time. UGI 12/18/20 found a small hiatal hernia and "minimal thickening of the small bowel loops which may be related to enteritis". SIBO was documented on lactulose breath test 10/25/19 and treated with Xifaxan/Neomycin 2019 and again last December.Other GI ice studies have largely been unremarkable, including several CT abdomen/pelvis (7/19/19 and 12/8/14), NM GES 5/4/18, colonoscopy 9/27/12 and EGD/colonoscopy 1/2/03.Family history is negative for Adams's, esophageal and colon neoplasm. His brother had gastric adenocarcinoma.

## 2022-12-09 LAB
C-REACTIVE PROTEIN, QUANT: 4 MG/L (ref 0–10)
CBC, PLATELET, NO DIFFERENTIAL: HEMATOCRIT: 47.9 % (ref 37.5–51)
CBC, PLATELET, NO DIFFERENTIAL: HEMOGLOBIN: 15.3 G/DL (ref 13–17.7)
CBC, PLATELET, NO DIFFERENTIAL: MCH: 28.7 PG (ref 26.6–33)
CBC, PLATELET, NO DIFFERENTIAL: MCHC: 31.9 G/DL (ref 31.5–35.7)
CBC, PLATELET, NO DIFFERENTIAL: MCV: 90 FL (ref 79–97)
CBC, PLATELET, NO DIFFERENTIAL: NRBC: (no result)
CBC, PLATELET, NO DIFFERENTIAL: PLATELETS: 314 X10E3/UL (ref 150–450)
CBC, PLATELET, NO DIFFERENTIAL: RBC: 5.34 X10E6/UL (ref 4.14–5.8)
CBC, PLATELET, NO DIFFERENTIAL: RDW: 13.6 % (ref 11.6–15.4)
CBC, PLATELET, NO DIFFERENTIAL: WBC: 5.9 X10E3/UL (ref 3.4–10.8)
COMP. METABOLIC PANEL (14): A/G RATIO: 3.3 — HIGH (ref 1.2–2.2)
COMP. METABOLIC PANEL (14): ALBUMIN: 4.9 G/DL — HIGH (ref 3.7–4.7)
COMP. METABOLIC PANEL (14): ALKALINE PHOSPHATASE: 84 IU/L (ref 44–121)
COMP. METABOLIC PANEL (14): ALT (SGPT): 20 IU/L (ref 0–44)
COMP. METABOLIC PANEL (14): AST (SGOT): 17 IU/L (ref 0–40)
COMP. METABOLIC PANEL (14): BILIRUBIN, TOTAL: 0.3 MG/DL (ref 0–1.2)
COMP. METABOLIC PANEL (14): BUN/CREATININE RATIO: 18 (ref 10–24)
COMP. METABOLIC PANEL (14): BUN: 18 MG/DL (ref 8–27)
COMP. METABOLIC PANEL (14): CALCIUM: 9.8 MG/DL (ref 8.6–10.2)
COMP. METABOLIC PANEL (14): CARBON DIOXIDE, TOTAL: 22 MMOL/L (ref 20–29)
COMP. METABOLIC PANEL (14): CHLORIDE: 103 MMOL/L (ref 96–106)
COMP. METABOLIC PANEL (14): CREATININE: 0.98 MG/DL (ref 0.76–1.27)
COMP. METABOLIC PANEL (14): EGFR: 80 ML/MIN/1.73 (ref 59–?)
COMP. METABOLIC PANEL (14): GLOBULIN, TOTAL: 1.5 G/DL (ref 1.5–4.5)
COMP. METABOLIC PANEL (14): GLUCOSE: 86 MG/DL (ref 70–99)
COMP. METABOLIC PANEL (14): POTASSIUM: 4.6 MMOL/L (ref 3.5–5.2)
COMP. METABOLIC PANEL (14): PROTEIN, TOTAL: 6.4 G/DL (ref 6–8.5)
COMP. METABOLIC PANEL (14): SODIUM: 143 MMOL/L (ref 134–144)
SEDIMENTATION RATE-WESTERGREN: 2 MM/HR (ref 0–30)

## 2022-12-31 ENCOUNTER — OFFICE (OUTPATIENT)
Dept: URBAN - METROPOLITAN AREA CLINIC 19 | Facility: CLINIC | Age: 77
End: 2022-12-31

## 2022-12-31 DIAGNOSIS — E66.3 OVERWEIGHT: ICD-10-CM

## 2022-12-31 DIAGNOSIS — K21.9 GASTRO-ESOPHAGEAL REFLUX DISEASE WITHOUT ESOPHAGITIS: ICD-10-CM

## 2022-12-31 DIAGNOSIS — E78.5 HYPERLIPIDEMIA, UNSPECIFIED: ICD-10-CM

## 2022-12-31 DIAGNOSIS — K22.70 BARRETT'S ESOPHAGUS WITHOUT DYSPLASIA: ICD-10-CM

## 2022-12-31 DIAGNOSIS — E11.9 TYPE 2 DIABETES MELLITUS WITHOUT COMPLICATIONS: ICD-10-CM

## 2022-12-31 DIAGNOSIS — G47.30 SLEEP APNEA, UNSPECIFIED: ICD-10-CM

## 2022-12-31 PROCEDURE — 99490 CHRNC CARE MGMT STAFF 1ST 20: CPT | Performed by: INTERNAL MEDICINE

## 2022-12-31 PROCEDURE — 99439 CHRNC CARE MGMT STAF EA ADDL: CPT | Performed by: INTERNAL MEDICINE

## 2023-01-17 ENCOUNTER — OFFICE (OUTPATIENT)
Dept: URBAN - METROPOLITAN AREA PATHOLOGY 20 | Facility: PATHOLOGY | Age: 78
End: 2023-01-17

## 2023-01-17 ENCOUNTER — AMBULATORY SURGICAL CENTER (OUTPATIENT)
Dept: URBAN - METROPOLITAN AREA SURGERY 2 | Facility: SURGERY | Age: 78
End: 2023-01-17
Payer: COMMERCIAL

## 2023-01-17 ENCOUNTER — AMBULATORY SURGICAL CENTER (OUTPATIENT)
Dept: URBAN - METROPOLITAN AREA SURGERY 2 | Facility: SURGERY | Age: 78
End: 2023-01-17

## 2023-01-17 DIAGNOSIS — K21.00 GASTRO-ESOPHAGEAL REFLUX DISEASE WITH ESOPHAGITIS, WITHOUT B: ICD-10-CM

## 2023-01-17 DIAGNOSIS — K31.9 DISEASE OF STOMACH AND DUODENUM, UNSPECIFIED: ICD-10-CM

## 2023-01-17 DIAGNOSIS — K57.30 DIVERTICULOSIS OF LARGE INTESTINE WITHOUT PERFORATION OR ABS: ICD-10-CM

## 2023-01-17 DIAGNOSIS — K59.9 FUNCTIONAL INTESTINAL DISORDER, UNSPECIFIED: ICD-10-CM

## 2023-01-17 DIAGNOSIS — R10.32 LEFT LOWER QUADRANT PAIN: ICD-10-CM

## 2023-01-17 DIAGNOSIS — R93.3 ABNORMAL FINDINGS ON DIAGNOSTIC IMAGING OF OTHER PARTS OF DI: ICD-10-CM

## 2023-01-17 PROBLEM — K20.90 ESOPHAGITIS, UNSPECIFIED WITHOUT BLEEDING: Status: ACTIVE | Noted: 2023-01-17

## 2023-01-17 PROBLEM — K20.80 OTHER ESOPHAGITIS WITHOUT BLEEDING: Status: ACTIVE | Noted: 2023-01-17

## 2023-01-17 PROCEDURE — 45331 SIGMOIDOSCOPY AND BIOPSY: CPT | Mod: 51 | Performed by: INTERNAL MEDICINE

## 2023-01-17 PROCEDURE — 43239 EGD BIOPSY SINGLE/MULTIPLE: CPT | Performed by: INTERNAL MEDICINE

## 2023-01-17 PROCEDURE — G8918 PT W/O PREOP ORDER IV AB PRO: HCPCS | Performed by: INTERNAL MEDICINE

## 2023-01-30 ENCOUNTER — OFFICE (OUTPATIENT)
Dept: URBAN - METROPOLITAN AREA CLINIC 19 | Facility: CLINIC | Age: 78
End: 2023-01-30

## 2023-01-30 DIAGNOSIS — K21.9 GASTRO-ESOPHAGEAL REFLUX DISEASE WITHOUT ESOPHAGITIS: ICD-10-CM

## 2023-01-30 DIAGNOSIS — E66.3 OVERWEIGHT: ICD-10-CM

## 2023-01-30 DIAGNOSIS — K22.70 BARRETT'S ESOPHAGUS WITHOUT DYSPLASIA: ICD-10-CM

## 2023-01-30 DIAGNOSIS — G47.30 SLEEP APNEA, UNSPECIFIED: ICD-10-CM

## 2023-01-30 DIAGNOSIS — E11.9 TYPE 2 DIABETES MELLITUS WITHOUT COMPLICATIONS: ICD-10-CM

## 2023-01-30 DIAGNOSIS — E78.5 HYPERLIPIDEMIA, UNSPECIFIED: ICD-10-CM

## 2023-01-30 PROCEDURE — 99490 CHRNC CARE MGMT STAFF 1ST 20: CPT | Performed by: INTERNAL MEDICINE

## 2023-01-30 PROCEDURE — 99439 CHRNC CARE MGMT STAF EA ADDL: CPT | Performed by: INTERNAL MEDICINE

## 2023-02-24 VITALS
DIASTOLIC BLOOD PRESSURE: 76 MMHG | RESPIRATION RATE: 17 BRPM | SYSTOLIC BLOOD PRESSURE: 114 MMHG | OXYGEN SATURATION: 93 % | OXYGEN SATURATION: 95 % | SYSTOLIC BLOOD PRESSURE: 131 MMHG | SYSTOLIC BLOOD PRESSURE: 112 MMHG | SYSTOLIC BLOOD PRESSURE: 162 MMHG | SYSTOLIC BLOOD PRESSURE: 162 MMHG | DIASTOLIC BLOOD PRESSURE: 76 MMHG | DIASTOLIC BLOOD PRESSURE: 73 MMHG | HEART RATE: 74 BPM | TEMPERATURE: 98.2 F | DIASTOLIC BLOOD PRESSURE: 68 MMHG | HEART RATE: 71 BPM | DIASTOLIC BLOOD PRESSURE: 71 MMHG | RESPIRATION RATE: 18 BRPM | HEART RATE: 85 BPM | HEART RATE: 74 BPM | TEMPERATURE: 98.2 F | OXYGEN SATURATION: 94 % | HEIGHT: 66 IN | HEART RATE: 72 BPM | DIASTOLIC BLOOD PRESSURE: 85 MMHG | HEIGHT: 66 IN | SYSTOLIC BLOOD PRESSURE: 114 MMHG | TEMPERATURE: 98.6 F | SYSTOLIC BLOOD PRESSURE: 112 MMHG | SYSTOLIC BLOOD PRESSURE: 131 MMHG | DIASTOLIC BLOOD PRESSURE: 73 MMHG | RESPIRATION RATE: 17 BRPM | RESPIRATION RATE: 17 BRPM | DIASTOLIC BLOOD PRESSURE: 71 MMHG | OXYGEN SATURATION: 97 % | SYSTOLIC BLOOD PRESSURE: 162 MMHG | OXYGEN SATURATION: 95 % | SYSTOLIC BLOOD PRESSURE: 114 MMHG | SYSTOLIC BLOOD PRESSURE: 110 MMHG | SYSTOLIC BLOOD PRESSURE: 110 MMHG | HEIGHT: 66 IN | HEART RATE: 74 BPM | TEMPERATURE: 98.6 F | DIASTOLIC BLOOD PRESSURE: 73 MMHG | HEART RATE: 85 BPM | TEMPERATURE: 98.6 F | SYSTOLIC BLOOD PRESSURE: 112 MMHG | OXYGEN SATURATION: 94 % | HEART RATE: 71 BPM | DIASTOLIC BLOOD PRESSURE: 85 MMHG | OXYGEN SATURATION: 95 % | SYSTOLIC BLOOD PRESSURE: 110 MMHG | HEART RATE: 72 BPM | OXYGEN SATURATION: 98 % | RESPIRATION RATE: 18 BRPM | DIASTOLIC BLOOD PRESSURE: 68 MMHG | HEART RATE: 71 BPM | OXYGEN SATURATION: 97 % | SYSTOLIC BLOOD PRESSURE: 131 MMHG | HEART RATE: 85 BPM | DIASTOLIC BLOOD PRESSURE: 68 MMHG | DIASTOLIC BLOOD PRESSURE: 76 MMHG | OXYGEN SATURATION: 93 % | OXYGEN SATURATION: 94 % | OXYGEN SATURATION: 98 % | OXYGEN SATURATION: 97 % | OXYGEN SATURATION: 98 % | DIASTOLIC BLOOD PRESSURE: 71 MMHG | DIASTOLIC BLOOD PRESSURE: 85 MMHG | TEMPERATURE: 98.2 F | HEART RATE: 72 BPM | OXYGEN SATURATION: 93 % | RESPIRATION RATE: 18 BRPM

## 2023-02-28 ENCOUNTER — OFFICE (OUTPATIENT)
Dept: URBAN - METROPOLITAN AREA CLINIC 19 | Facility: CLINIC | Age: 78
End: 2023-02-28

## 2023-02-28 DIAGNOSIS — K22.70 BARRETT'S ESOPHAGUS WITHOUT DYSPLASIA: ICD-10-CM

## 2023-02-28 DIAGNOSIS — E11.9 TYPE 2 DIABETES MELLITUS WITHOUT COMPLICATIONS: ICD-10-CM

## 2023-02-28 DIAGNOSIS — K21.9 GASTRO-ESOPHAGEAL REFLUX DISEASE WITHOUT ESOPHAGITIS: ICD-10-CM

## 2023-02-28 DIAGNOSIS — E66.3 OVERWEIGHT: ICD-10-CM

## 2023-02-28 PROCEDURE — 99489 CPLX CHRNC CARE EA ADDL 30: CPT | Performed by: INTERNAL MEDICINE

## 2023-02-28 PROCEDURE — 99487 CPLX CHRNC CARE 1ST 60 MIN: CPT | Performed by: INTERNAL MEDICINE

## 2023-03-31 ENCOUNTER — OFFICE (OUTPATIENT)
Dept: URBAN - METROPOLITAN AREA CLINIC 19 | Facility: CLINIC | Age: 78
End: 2023-03-31

## 2023-03-31 DIAGNOSIS — G47.30 SLEEP APNEA, UNSPECIFIED: ICD-10-CM

## 2023-03-31 DIAGNOSIS — K21.9 GASTRO-ESOPHAGEAL REFLUX DISEASE WITHOUT ESOPHAGITIS: ICD-10-CM

## 2023-03-31 DIAGNOSIS — K22.70 BARRETT'S ESOPHAGUS WITHOUT DYSPLASIA: ICD-10-CM

## 2023-03-31 DIAGNOSIS — E66.3 OVERWEIGHT: ICD-10-CM

## 2023-03-31 PROCEDURE — 99490 CHRNC CARE MGMT STAFF 1ST 20: CPT | Performed by: INTERNAL MEDICINE

## 2023-04-26 ENCOUNTER — OFFICE (OUTPATIENT)
Dept: URBAN - METROPOLITAN AREA CLINIC 19 | Facility: CLINIC | Age: 78
End: 2023-04-26

## 2023-04-26 VITALS
OXYGEN SATURATION: 100 % | HEART RATE: 83 BPM | HEIGHT: 66 IN | WEIGHT: 172 LBS | SYSTOLIC BLOOD PRESSURE: 151 MMHG | DIASTOLIC BLOOD PRESSURE: 77 MMHG

## 2023-04-26 DIAGNOSIS — K22.711 BARRETT'S ESOPHAGUS WITH HIGH GRADE DYSPLASIA: ICD-10-CM

## 2023-04-26 DIAGNOSIS — T82.897A: ICD-10-CM

## 2023-04-26 DIAGNOSIS — Z86.010 PERSONAL HISTORY OF COLONIC POLYPS: ICD-10-CM

## 2023-04-26 DIAGNOSIS — R10.32 LEFT LOWER QUADRANT PAIN: ICD-10-CM

## 2023-04-26 PROCEDURE — 99214 OFFICE O/P EST MOD 30 MIN: CPT

## 2023-04-26 NOTE — SERVICEHPINOTES
77-year-old  diabetic white male returns for recurrent LLQ pain concerning for diverticulitis.   He has had multiple bouts of diverticulitis including in July of last year, December of last year and even most recently in February when he was treated empirically with a course of antibiotics and given a refill.  Symptoms began abruptly yesterday and were significantly painful even to walk.  He had an extra course of Cipro/Flagyl at his house and he started taking this last night.  He denies fever, change in bowel habits or overt GI bleeding.  He is currently not having reflux and is off pantoprazole and other antacid therapy.  He denies dysphagia.  He is on Plavix for history of stents placed and follows with cardiologist, Lavelle Ortega MD. 
carson Paige was last here for EGD/ sigmoidoscopy 1/17/23 which found mild reflux esophagitis, gastritis and diverticulosis coli.  Biopsies were negative for Adams's esophagus, H pylori and microscopic colitis. Routine lab 12/8/22 was normal.  CT abd/pelvis 12/16/22 found "colitis involving the distal descending and proximal sigmoid colon."I last saw him 7/7/22 for complaints of acute, recurrent nausea/vomiting.  Routine blood work was unremarkable and CT abdomen/ pelvis 7/11/22 was normal (after a course of antibiotics).  He had had previous similar complaints the year prior.  AUS 11/16/21 was normal and HIDA scan was remarkable for ejection fraction of 97% .  We referred him to surgeon, Hesham Davis MD for biliary hyperkinesia and underwent lap cholecystectomy. He reports significant improvement and resolution in his symptoms.He had short-segment Adams's esophagus with high-grade dysplasia and underwent RFA halo treatment with Dr. Ruth last summer 7/21/21.  He had EGD with RFA 4/14/22.He was here 4/8/21 to see Dr. Ruth following a recent hospitalization at [facility] for abdominal pain, nausea, upper GI bleed (melena) and mild anemia. CTA 3/20/21 found no evidence of active GI bleed. His hematocrit dropped from 42.9% to 37.2%. No blood transfusions were required. Dr. Ruth performed colonoscopy 3/22/21 and found diverticula and a small adenomatous colon polyp. Dr. Fung performed EGD the day prior to admission on 3/18/21 and found a short-segment Adams's esophagus. Biopsies found a focus of high-grade dysplasia. Halo RFA was considered. Interestingly, EGD 1/13/21 found no esophagitis. Biopsies were negative for EoE, H. pylori and celiac.Dr. Ruth discussed the situation with Dr. Fung that his EGD in January was normal, but short-segment Adams's esophagus with HGD was documented in March and whether it was best to repeat EGD with biopsy or proceed with Halo RFA therapy.  He was seen last December for belching and flatus. He was taking Zantac 300 mg BID at that time. UGI 12/18/20 found a small hiatal hernia and "minimal thickening of the small bowel loops which may be related to enteritis". SIBO was documented on lactulose breath test 10/25/19 and treated with Xifaxan/Neomycin 2019 and again last December.Other GI ice studies have largely been unremarkable, including several CT abdomen/pelvis (7/19/19 and 12/8/14), NM GES 5/4/18, colonoscopy 9/27/12 and EGD/colonoscopy 1/2/03.Family history is negative for Adams's, esophageal and colon neoplasm. His brother had gastric adenocarcinoma.

## 2023-04-27 LAB
C-REACTIVE PROTEIN, QUANT: 12 MG/L — HIGH (ref 0–10)
CBC, PLATELET, NO DIFFERENTIAL: HEMATOCRIT: 45 % (ref 37.5–51)
CBC, PLATELET, NO DIFFERENTIAL: HEMOGLOBIN: 14.8 G/DL (ref 13–17.7)
CBC, PLATELET, NO DIFFERENTIAL: MCH: 29.5 PG (ref 26.6–33)
CBC, PLATELET, NO DIFFERENTIAL: MCHC: 32.9 G/DL (ref 31.5–35.7)
CBC, PLATELET, NO DIFFERENTIAL: MCV: 90 FL (ref 79–97)
CBC, PLATELET, NO DIFFERENTIAL: PLATELETS: 275 X10E3/UL (ref 150–450)
CBC, PLATELET, NO DIFFERENTIAL: RBC: 5.01 X10E6/UL (ref 4.14–5.8)
CBC, PLATELET, NO DIFFERENTIAL: RDW: 14.8 % (ref 11.6–15.4)
CBC, PLATELET, NO DIFFERENTIAL: WBC: 4.9 X10E3/UL (ref 3.4–10.8)
COMP. METABOLIC PANEL (14): A/G RATIO: 2.5 — HIGH (ref 1.2–2.2)
COMP. METABOLIC PANEL (14): ALBUMIN: 4.8 G/DL — HIGH (ref 3.7–4.7)
COMP. METABOLIC PANEL (14): ALKALINE PHOSPHATASE: 64 IU/L (ref 44–121)
COMP. METABOLIC PANEL (14): ALT (SGPT): 13 IU/L (ref 0–44)
COMP. METABOLIC PANEL (14): AST (SGOT): 16 IU/L (ref 0–40)
COMP. METABOLIC PANEL (14): BILIRUBIN, TOTAL: 0.2 MG/DL (ref 0–1.2)
COMP. METABOLIC PANEL (14): BUN/CREATININE RATIO: 15 (ref 10–24)
COMP. METABOLIC PANEL (14): BUN: 17 MG/DL (ref 8–27)
COMP. METABOLIC PANEL (14): CALCIUM: 10 MG/DL (ref 8.6–10.2)
COMP. METABOLIC PANEL (14): CARBON DIOXIDE, TOTAL: 20 MMOL/L (ref 20–29)
COMP. METABOLIC PANEL (14): CHLORIDE: 98 MMOL/L (ref 96–106)
COMP. METABOLIC PANEL (14): CREATININE: 1.11 MG/DL (ref 0.76–1.27)
COMP. METABOLIC PANEL (14): EGFR: 69 ML/MIN/1.73 (ref 59–?)
COMP. METABOLIC PANEL (14): GLOBULIN, TOTAL: 1.9 G/DL (ref 1.5–4.5)
COMP. METABOLIC PANEL (14): GLUCOSE: 144 MG/DL — HIGH (ref 70–99)
COMP. METABOLIC PANEL (14): POTASSIUM: 4.7 MMOL/L (ref 3.5–5.2)
COMP. METABOLIC PANEL (14): PROTEIN, TOTAL: 6.7 G/DL (ref 6–8.5)
COMP. METABOLIC PANEL (14): SODIUM: 141 MMOL/L (ref 134–144)
SEDIMENTATION RATE-WESTERGREN: (no result) MM/HR

## 2023-04-30 ENCOUNTER — OFFICE (OUTPATIENT)
Dept: URBAN - METROPOLITAN AREA CLINIC 19 | Facility: CLINIC | Age: 78
End: 2023-04-30

## 2023-04-30 DIAGNOSIS — K22.70 BARRETT'S ESOPHAGUS WITHOUT DYSPLASIA: ICD-10-CM

## 2023-04-30 DIAGNOSIS — G47.30 SLEEP APNEA, UNSPECIFIED: ICD-10-CM

## 2023-04-30 DIAGNOSIS — E11.9 TYPE 2 DIABETES MELLITUS WITHOUT COMPLICATIONS: ICD-10-CM

## 2023-04-30 DIAGNOSIS — E78.5 HYPERLIPIDEMIA, UNSPECIFIED: ICD-10-CM

## 2023-04-30 DIAGNOSIS — E66.3 OVERWEIGHT: ICD-10-CM

## 2023-04-30 DIAGNOSIS — K21.9 GASTRO-ESOPHAGEAL REFLUX DISEASE WITHOUT ESOPHAGITIS: ICD-10-CM

## 2023-04-30 PROCEDURE — 99490 CHRNC CARE MGMT STAFF 1ST 20: CPT | Performed by: INTERNAL MEDICINE

## 2023-05-31 ENCOUNTER — OFFICE (OUTPATIENT)
Dept: URBAN - METROPOLITAN AREA CLINIC 19 | Facility: CLINIC | Age: 78
End: 2023-05-31

## 2023-05-31 DIAGNOSIS — K22.70 BARRETT'S ESOPHAGUS WITHOUT DYSPLASIA: ICD-10-CM

## 2023-05-31 DIAGNOSIS — E66.3 OVERWEIGHT: ICD-10-CM

## 2023-05-31 DIAGNOSIS — E11.9 TYPE 2 DIABETES MELLITUS WITHOUT COMPLICATIONS: ICD-10-CM

## 2023-05-31 DIAGNOSIS — E78.5 HYPERLIPIDEMIA, UNSPECIFIED: ICD-10-CM

## 2023-05-31 DIAGNOSIS — K21.9 GASTRO-ESOPHAGEAL REFLUX DISEASE WITHOUT ESOPHAGITIS: ICD-10-CM

## 2023-05-31 DIAGNOSIS — G47.30 SLEEP APNEA, UNSPECIFIED: ICD-10-CM

## 2023-05-31 PROCEDURE — 99439 CHRNC CARE MGMT STAF EA ADDL: CPT | Performed by: INTERNAL MEDICINE

## 2023-05-31 PROCEDURE — 99490 CHRNC CARE MGMT STAFF 1ST 20: CPT | Performed by: INTERNAL MEDICINE

## 2023-06-30 ENCOUNTER — OFFICE (OUTPATIENT)
Dept: URBAN - METROPOLITAN AREA CLINIC 19 | Facility: CLINIC | Age: 78
End: 2023-06-30

## 2023-06-30 DIAGNOSIS — E11.9 TYPE 2 DIABETES MELLITUS WITHOUT COMPLICATIONS: ICD-10-CM

## 2023-06-30 DIAGNOSIS — E78.5 HYPERLIPIDEMIA, UNSPECIFIED: ICD-10-CM

## 2023-06-30 DIAGNOSIS — K22.70 BARRETT'S ESOPHAGUS WITHOUT DYSPLASIA: ICD-10-CM

## 2023-06-30 DIAGNOSIS — K21.9 GASTRO-ESOPHAGEAL REFLUX DISEASE WITHOUT ESOPHAGITIS: ICD-10-CM

## 2023-06-30 DIAGNOSIS — E66.3 OVERWEIGHT: ICD-10-CM

## 2023-06-30 DIAGNOSIS — G47.30 SLEEP APNEA, UNSPECIFIED: ICD-10-CM

## 2023-06-30 PROCEDURE — 99490 CHRNC CARE MGMT STAFF 1ST 20: CPT | Performed by: INTERNAL MEDICINE

## 2023-06-30 PROCEDURE — 99439 CHRNC CARE MGMT STAF EA ADDL: CPT | Performed by: INTERNAL MEDICINE

## 2024-02-15 ENCOUNTER — OFFICE (OUTPATIENT)
Dept: URBAN - METROPOLITAN AREA CLINIC 19 | Facility: CLINIC | Age: 79
End: 2024-02-15

## 2024-02-15 VITALS
DIASTOLIC BLOOD PRESSURE: 66 MMHG | OXYGEN SATURATION: 100 % | WEIGHT: 172 LBS | HEART RATE: 77 BPM | SYSTOLIC BLOOD PRESSURE: 137 MMHG | HEIGHT: 66 IN

## 2024-02-15 DIAGNOSIS — Z86.010 PERSONAL HISTORY OF COLONIC POLYPS: ICD-10-CM

## 2024-02-15 DIAGNOSIS — K21.9 GASTRO-ESOPHAGEAL REFLUX DISEASE WITHOUT ESOPHAGITIS: ICD-10-CM

## 2024-02-15 DIAGNOSIS — R10.32 LEFT LOWER QUADRANT PAIN: ICD-10-CM

## 2024-02-15 DIAGNOSIS — K22.711 BARRETT'S ESOPHAGUS WITH HIGH GRADE DYSPLASIA: ICD-10-CM

## 2024-02-15 DIAGNOSIS — T82.897A: ICD-10-CM

## 2024-02-15 DIAGNOSIS — R11.2 NAUSEA WITH VOMITING, UNSPECIFIED: ICD-10-CM

## 2024-02-15 DIAGNOSIS — R19.4 CHANGE IN BOWEL HABIT: ICD-10-CM

## 2024-02-15 PROCEDURE — 99214 OFFICE O/P EST MOD 30 MIN: CPT

## 2024-02-15 RX ORDER — PANTOPRAZOLE SODIUM 40 MG/1
40 TABLET, DELAYED RELEASE ORAL
Qty: 90 | Refills: 1 | Status: ACTIVE
Start: 2024-02-15

## 2024-02-15 NOTE — SERVICENOTES
The patient's assessment was reviewed with Ceasar Ruth MD and a collaborative plan of care was established.

## 2024-02-15 NOTE — SERVICEHPINOTES
78-year-old  diabetic WM returns for complaints of intermittent nausea, belching, progressive GERD, change in bowel habit, and mild recurrent LLQ pain. He called the office 2 months ago (December 2023) complaining of LLQ pain concerning for diverticulitis. He was treated empirically with a course of Cipro and Flagyl. He presented to the ED at [facility] on 1/1/24 for evaluation due to intolerance to Flagyl. CT abd/pelvis 1/1/24 found no acute abdominopelvic findings and diverticulosis coli without evidence of diverticulitis. His LLQ pain resolved with completion of the Cipro and Flagyl, however, the onset of his current symptoms happened immediately following the completion of the antibiotics approximately 6 weeks ago. He has intermittent nausea that is not correlated with eating. His GERD has been well controlled with Pepcid OTC as needed, but he reports progressive acid reflux. He has intermittent diarrhea that alternates with normal bowel movements (sometimes 3 per day). His LLQ pain has returned over the past few weeks, but is currently mild and intermittent. He denies dysphagia, vomiting or any overt GI bleeding.
carson Paige had short-segment Adams's esophagus with high-grade dysplasia and underwent RFA halo treatment with Dr. Ruth 7/21/21. He had EGD with RFA 4/14/22.
carson byrd He is on Plavix for history of CAD s/p stent placement in 202 followed by cardiologist, Lavelle Ortega MD. 
carson byrd EGD/sigmoidoscopy 1/17/23 which found mild reflux esophagitis, gastritis and diverticulosis coli. Biopsies were negative for Adams's esophagus, H pylori and microscopic colitis.  carson byrd His last office visit 4/26/23 was for complaints of recurrent LLQ pain concerning for diverticulitis. He has had multiple bouts of diverticulitis (including July and December of 2022 and February 2023) when he was treated empirically with a course of antibiotics and given a refill. Symptoms began abruptly and were significantly painful even to walk. He had an extra course of Cipro/Flagyl at his house and he started taking this the night prior to his office visit. He was not currently not having reflux and was off Protonix and other antacid therapy.
He was seen for an office visit 7/7/22 for complaints of acute, recurrent nausea/vomiting. CT abdomen/ pelvis 7/11/22 was normal (after a course of antibiotics).  He had previous similar complaints a year prior.  AUS 11/16/21 was normal and HIDA scan was remarkable for ejection fraction of 97% . We referred him to surgeon, Hesham Davis MD for biliary hyperkinesia and underwent lap cholecystectomy. He reports significant improvement and resolution in his symptoms.
br
brCT abd/pelvis 12/16/22 found "colitis involving the distal descending and proximal sigmoid colon." He was here 4/8/21 to see Dr. Ruth following a recent hospitalization at [facility] for abdominal pain, nausea, upper GI bleed (melena) and mild anemia. CTA 3/20/21 found no evidence of active GI bleed. His hematocrit dropped from 42.9% to 37.2%. No blood transfusions were required. Dr. Ruth performed colonoscopy 3/22/21 and found diverticula and a small adenomatous colon polyp. Dr. Fung performed EGD the day prior to admission on 3/18/21 and found a short-segment Adams's esophagus. Biopsies found a focus of high-grade dysplasia. Halo RFA was considered. Interestingly, EGD 1/13/21 found no esophagitis. Biopsies were negative for EoE, H. pylori and celiac.UGI 12/18/20 found a small hiatal hernia and "minimal thickening of the small bowel loops which may be related to enteritis". SIBO was documented on lactulose breath test 10/25/19 and treated with Xifaxan/Neomycin 2019.Other GI studies have largely been unremarkable, including several CT abdomen/pelvis (7/19/19 and 12/8/14), NM GES 5/4/18, colonoscopy 9/27/12 and EGD/colonoscopy 1/2/03.Family history is negative for Adams's, esophageal and colon neoplasm. His brother had gastric adenocarcinoma.

## 2024-02-16 LAB
CBC, PLATELET, NO DIFFERENTIAL: HEMATOCRIT: 41.4 % (ref 37.5–51)
CBC, PLATELET, NO DIFFERENTIAL: HEMOGLOBIN: 13.8 G/DL (ref 13–17.7)
CBC, PLATELET, NO DIFFERENTIAL: MCH: 29.6 PG (ref 26.6–33)
CBC, PLATELET, NO DIFFERENTIAL: MCHC: 33.3 G/DL (ref 31.5–35.7)
CBC, PLATELET, NO DIFFERENTIAL: MCV: 89 FL (ref 79–97)
CBC, PLATELET, NO DIFFERENTIAL: NRBC: (no result)
CBC, PLATELET, NO DIFFERENTIAL: PLATELETS: 300 X10E3/UL (ref 150–450)
CBC, PLATELET, NO DIFFERENTIAL: RBC: 4.66 X10E6/UL (ref 4.14–5.8)
CBC, PLATELET, NO DIFFERENTIAL: RDW: 14 % (ref 11.6–15.4)
CBC, PLATELET, NO DIFFERENTIAL: WBC: 6.2 X10E3/UL (ref 3.4–10.8)

## 2024-04-11 ENCOUNTER — OFFICE (OUTPATIENT)
Dept: URBAN - METROPOLITAN AREA CLINIC 19 | Facility: CLINIC | Age: 79
End: 2024-04-11
Payer: COMMERCIAL

## 2024-04-11 VITALS
HEART RATE: 69 BPM | HEIGHT: 66 IN | DIASTOLIC BLOOD PRESSURE: 62 MMHG | SYSTOLIC BLOOD PRESSURE: 126 MMHG | OXYGEN SATURATION: 100 % | WEIGHT: 173 LBS

## 2024-04-11 DIAGNOSIS — K21.9 GASTRO-ESOPHAGEAL REFLUX DISEASE WITHOUT ESOPHAGITIS: ICD-10-CM

## 2024-04-11 DIAGNOSIS — T82.897A: ICD-10-CM

## 2024-04-11 DIAGNOSIS — K22.711 BARRETT'S ESOPHAGUS WITH HIGH GRADE DYSPLASIA: ICD-10-CM

## 2024-04-11 DIAGNOSIS — Z86.010 PERSONAL HISTORY OF COLONIC POLYPS: ICD-10-CM

## 2024-04-11 DIAGNOSIS — R10.32 LEFT LOWER QUADRANT PAIN: ICD-10-CM

## 2024-04-11 PROCEDURE — 99213 OFFICE O/P EST LOW 20 MIN: CPT

## 2024-04-11 RX ORDER — CIPROFLOXACIN HYDROCHLORIDE 500 MG/1
TABLET, FILM COATED ORAL
Qty: 28 | Refills: 1 | Status: ACTIVE
Start: 2024-04-11

## 2024-04-11 NOTE — SERVICEHPINOTES
78-year-old  diabetic WM returns for complaints of recurrent LLQ pain and acute diarrhea. He was in the [facility] last week for his son's wedding when his symptoms began. He had actually taken Cipro and Flagyl with him just in case he needed it while he was gone. He has currently taken 7 days of antibiotics and his symptoms have largely resolved. He has had no diarrhea for the past two days. He is still experiencing mild tenderness in the LLQ. He denies fever, dysphagia, nausea, vomiting or any overt GI bleeding. His GERD seems well controlled on Protonix 40 mg/d.carson Ellison saw him 2/15/24 for complaints of intermittent nausea, belching, progressive GERD, change in bowel habit, and mild recurrent LLQ pain. He called the office in December 2023 complaining of LLQ pain concerning for diverticulitis. He was treated empirically with a course of Cipro and Flagyl. He presented to the ER at [facility] on 1/1/24 for evaluation due to intolerance to Flagyl. CT abd/pelvis 1/1/24 found no acute abdominopelvic findings and diverticulosis coli without evidence of diverticulitis. His LLQ pain resolved with completion of the Cipro and Flagyl, however, the onset of his current symptoms happened immediately  Following the completion of the antibiotics approximately 6 weeks ago.He had short-segment Adams's esophagus with high-grade dysplasia and underwent RFA halo treatment with Dr. Ruth 7/21/21. He had EGD with RFA 4/14/22.He is on Plavix for history of CAD s/p stent placement in 202 followed by cardiologist, Lavelle Ortega MD. EGD/sigmoidoscopy 1/17/23 which found mild reflux esophagitis, gastritis and diverticulosis coli. Biopsies were negative for Adams's esophagus, H pylori and microscopic colitis. His office visit 4/26/23 was for complaints of recurrent LLQ pain concerning for diverticulitis. He has had multiple bouts of diverticulitis (including July and December of 2022 and February 2023) when he was treated empirically with a course of antibiotics and given a refill. Symptoms began abruptly and were significantly painful even to walk. He had an extra course of Cipro/Flagyl at his house and he started taking this the night prior to his office visit. He was not currently not having reflux and was off Protonix and other antacid therapy.He was seen for an office visit 7/7/22 for complaints of acute, recurrent nausea/vomiting. CT abdomen/ pelvis 7/11/22 was normal (after a course of antibiotics).  He had previous similar complaints a year prior.  AUS 11/16/21 was normal and HIDA scan was remarkable for ejection fraction of 97% . We referred him to surgeon, Hesham Davis MD for biliary hyperkinesia and underwent lap cholecystectomy. He reports significant improvement and resolution in his symptoms.CT abd/pelvis 12/16/22 found "colitis involving the distal descending and proximal sigmoid colon."He was here 4/8/21 to see Dr. Ruth following a recent hospitalization at [facility] for abdominal pain, nausea, upper GI bleed (melena) and mild anemia. CTA 3/20/21 found no evidence of active GI bleed. His hematocrit dropped from 42.9% to 37.2%. No blood transfusions were required. Dr. Ruth performed colonoscopy 3/22/21 and found diverticula and a small adenomatous colon polyp. Dr. Fung performed EGD the day prior to admission on 3/18/21 and found a short-segment Adams's esophagus. Biopsies found a focus of high-grade dysplasia. Halo RFA was considered. Interestingly, EGD 1/13/21 found no esophagitis. Biopsies were negative for EoE, H. pylori and celiac.UGI 12/18/20 found a small hiatal hernia and "minimal thickening of the small bowel loops which may be related to enteritis". SIBO was documented on lactulose breath test 10/25/19 and treated with Xifaxan/Neomycin 2019.Other GI studies have largely been unremarkable, including several CT abdomen/pelvis (7/19/19 and 12/8/14), NM GES 5/4/18, colonoscopy 9/27/12 and EGD/colonoscopy 1/2/03.Family history is negative for Adams's, esophageal and colon neoplasm. His brother had gastric adenocarcinoma.

## 2025-04-14 ENCOUNTER — OFFICE (OUTPATIENT)
Dept: URBAN - METROPOLITAN AREA CLINIC 19 | Facility: CLINIC | Age: 80
End: 2025-04-14
Payer: MEDICARE

## 2025-04-14 VITALS
WEIGHT: 175 LBS | HEART RATE: 70 BPM | HEIGHT: 66 IN | DIASTOLIC BLOOD PRESSURE: 69 MMHG | SYSTOLIC BLOOD PRESSURE: 141 MMHG | OXYGEN SATURATION: 100 %

## 2025-04-14 DIAGNOSIS — Z86.0100 PERSONAL HISTORY OF COLON POLYPS, UNSPECIFIED: ICD-10-CM

## 2025-04-14 DIAGNOSIS — K22.711 BARRETT'S ESOPHAGUS WITH HIGH GRADE DYSPLASIA: ICD-10-CM

## 2025-04-14 DIAGNOSIS — T82.897A: ICD-10-CM

## 2025-04-14 DIAGNOSIS — R10.32 LEFT LOWER QUADRANT PAIN: ICD-10-CM

## 2025-04-14 DIAGNOSIS — K21.9 GASTRO-ESOPHAGEAL REFLUX DISEASE WITHOUT ESOPHAGITIS: ICD-10-CM

## 2025-04-14 PROCEDURE — 99214 OFFICE O/P EST MOD 30 MIN: CPT | Performed by: NURSE PRACTITIONER

## 2025-04-14 NOTE — SERVICEHPINOTES
79-year-old  diabetic WM returns for complaints of  Left lower quadrant pain, excessive burping, GERD. He has been having intermittent left lower quadrant pain around 3 to 4 times a month for the last several months. He also reports daily abdominal bloating and excessive burping, especially after meals. GERD is well controlled on Pantoprazole 40mg/d. He denies any nausea, vomiting, dysphagia, change in bowel habit, or overt GI bleeding. CT A/P 9/26/24 with prominent diverticulosis of the descending and sigmoid colon without definite acute diverticulitis. Labs on 9/25/24 were normal except elevated CRP=19.br
br We saw him 9/25/24 for 3 weeks of GERD w/ hoarseness, throat clearing lower abdominal pain, bloating and constipation which is a change for him. He stopped taking Protonix 40 mg/d and started taking Pepcid 20 mg occasionally as needed for GERD, but his GERD has become much more frequent with hoarseness and throat clearing. We saw him 4/11/24 for complaints of recurrent LLQ pain and acute diarrhea. He was in the [facility] the week prior for his son's wedding when his symptoms began.He had actually taken Cipro and Flagyl with him just in case he needed it while he was gone. He had taken 7 days of antibiotics and his symptoms have largely resolved when we saw him in clinic.We saw him 2/15/24 for complaints of intermittent nausea, belching, progressive GERD, change in bowel habit, and mild recurrent LLQ pain. He called the office in December 2023 complaining of LLQ pain concerning for diverticulitis. He was treated empirically with a course of Cipro and Flagyl. He presented to the ER at [facility] on 1/1/24 for evaluation due to intolerance to Flagyl. CT abd/pelvis 1/1/24 found no acute abdominopelvic findings and diverticulosis coli without evidence of diverticulitis. His LLQ pain resolved with completion of the Cipro and Flagyl, however, the onset of his current symptoms happened immediately  Following the completion of the antibiotics approximately 6 weeks ago.He had short-segment Adams's esophagus with high-grade dysplasia and underwent RFA halo treatment with Dr. Ruth 7/21/21. He had EGD with RFA 4/14/22.He is on Plavix for history of CAD s/p stent placement in 202 followed by cardiologist, Lavelle Ortega MD. EGD/sigmoidoscopy 1/17/23 which found mild reflux esophagitis, gastritis and diverticulosis coli. Biopsies were negative for Adams's esophagus, H pylori and microscopic colitis. His office visit 4/26/23 was for complaints of recurrent LLQ pain concerning for diverticulitis. He has had multiple bouts of diverticulitis (including July and December of 2022 and February 2023) when he was treated empirically with a course of antibiotics and given a refill. Symptoms began abruptly and were significantly painful even to walk. He had an extra course of Cipro/Flagyl at his house and he started taking this the night prior to his office visit. He was not currently not having reflux and was off Protonix and other antacid therapy.He was seen for an office visit 7/7/22 for complaints of acute, recurrent nausea/vomiting. CT abdomen/ pelvis 7/11/22 was normal (after a course of antibiotics).  He had previous similar complaints a year prior.  AUS 11/16/21 was normal and HIDA scan was remarkable for ejection fraction of 97% . We referred him to surgeon, Hesham Davis MD for biliary hyperkinesia and underwent lap cholecystectomy. He reports significant improvement and resolution in his symptoms.CT abd/pelvis 12/16/22 found "colitis involving the distal descending and proximal sigmoid colon."He was here 4/8/21 to see Dr. Ruth following a recent hospitalization at [facility] for abdominal pain, nausea, upper GI bleed (melena) and mild anemia. CTA 3/20/21 found no evidence of active GI bleed. His hematocrit dropped from 42.9% to 37.2%. No blood transfusions were required. Dr. Ruth performed colonoscopy 3/22/21 and found diverticula and a small adenomatous colon polyp. Dr. Fnug performed EGD the day prior to admission on 3/18/21 and found a short-segment Adams's esophagus. Biopsies found a focus of high-grade dysplasia. Halo RFA was considered. Interestingly, EGD 1/13/21 found no esophagitis. Biopsies were negative for EoE, H. pylori and celiac.UGI 12/18/20 found a small hiatal hernia and "minimal thickening of the small bowel loops which may be related to enteritis". SIBO was documented on lactulose breath test 10/25/19 and treated with Xifaxan/Neomycin 2019.Other GI studies have largely been unremarkable, including several CT abdomen/pelvis (7/19/19 and 12/8/14), NM GES 5/4/18, colonoscopy 9/27/12 and EGD/colonoscopy 1/2/03.Family history is negative for Adams's, esophageal and colon neoplasm. His brother had gastric adenocarcinoma.

## 2025-05-20 ENCOUNTER — OFFICE (OUTPATIENT)
Dept: URBAN - METROPOLITAN AREA CLINIC 11 | Facility: CLINIC | Age: 80
End: 2025-05-20
Payer: MEDICARE

## 2025-05-20 DIAGNOSIS — R14.0 ABDOMINAL DISTENSION (GASEOUS): ICD-10-CM

## 2025-05-20 PROCEDURE — 91065 BREATH HYDROGEN/METHANE TEST: CPT | Performed by: INTERNAL MEDICINE
